# Patient Record
Sex: MALE | Race: BLACK OR AFRICAN AMERICAN | NOT HISPANIC OR LATINO | Employment: UNEMPLOYED | URBAN - METROPOLITAN AREA
[De-identification: names, ages, dates, MRNs, and addresses within clinical notes are randomized per-mention and may not be internally consistent; named-entity substitution may affect disease eponyms.]

---

## 2021-07-23 ENCOUNTER — HISTORICAL (OUTPATIENT)
Dept: ADMINISTRATIVE | Facility: HOSPITAL | Age: 21
End: 2021-07-23

## 2021-07-23 LAB — SARS-COV-2 RNA RESP QL NAA+PROBE: NOT DETECTED

## 2022-04-10 ENCOUNTER — HISTORICAL (OUTPATIENT)
Dept: ADMINISTRATIVE | Facility: HOSPITAL | Age: 22
End: 2022-04-10

## 2022-04-25 VITALS
OXYGEN SATURATION: 100 % | WEIGHT: 185.19 LBS | SYSTOLIC BLOOD PRESSURE: 142 MMHG | BODY MASS INDEX: 23.03 KG/M2 | HEIGHT: 75 IN | DIASTOLIC BLOOD PRESSURE: 80 MMHG

## 2023-01-16 ENCOUNTER — OFFICE VISIT (OUTPATIENT)
Dept: URGENT CARE | Facility: CLINIC | Age: 23
End: 2023-01-16
Payer: MEDICAID

## 2023-01-16 VITALS
BODY MASS INDEX: 22.75 KG/M2 | DIASTOLIC BLOOD PRESSURE: 83 MMHG | OXYGEN SATURATION: 100 % | SYSTOLIC BLOOD PRESSURE: 149 MMHG | HEART RATE: 54 BPM | RESPIRATION RATE: 16 BRPM | TEMPERATURE: 99 F | HEIGHT: 75 IN | WEIGHT: 183 LBS

## 2023-01-16 DIAGNOSIS — R21 RASH OF BODY: Primary | ICD-10-CM

## 2023-01-16 PROBLEM — Z91.89 AT RISK FOR INFECTION: Status: ACTIVE | Noted: 2023-01-16

## 2023-01-16 PROCEDURE — 99215 OFFICE O/P EST HI 40 MIN: CPT | Mod: PBBFAC | Performed by: NURSE PRACTITIONER

## 2023-01-16 PROCEDURE — 99213 OFFICE O/P EST LOW 20 MIN: CPT | Mod: S$PBB,,, | Performed by: NURSE PRACTITIONER

## 2023-01-16 PROCEDURE — 99213 PR OFFICE/OUTPT VISIT, EST, LEVL III, 20-29 MIN: ICD-10-PCS | Mod: S$PBB,,, | Performed by: NURSE PRACTITIONER

## 2023-01-16 RX ORDER — TRIAMCINOLONE ACETONIDE 1 MG/G
OINTMENT TOPICAL 2 TIMES DAILY
Qty: 30 G | Refills: 0 | Status: SHIPPED | OUTPATIENT
Start: 2023-01-16 | End: 2023-01-30

## 2023-01-16 RX ORDER — KETOCONAZOLE 20 MG/G
CREAM TOPICAL DAILY
Qty: 60 G | Refills: 0 | Status: SHIPPED | OUTPATIENT
Start: 2023-01-16

## 2023-01-16 NOTE — PROGRESS NOTES
"Subjective:       Patient ID: Epi Coreas is a 23 y.o. male.    Vitals:  height is 6' 2.8" (1.9 m) and weight is 83 kg (183 lb). His oral temperature is 99 °F (37.2 °C). His blood pressure is 149/83 (abnormal) and his pulse is 54 (abnormal). His respiration is 16 and oxygen saturation is 100%.     Chief Complaint: Rash (R upper leg x 1 year)    HPI x1 year. States it itches sometimes, but he is mostly bothered by how it looks (dark). 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 He does not appear ill. normal  Pulmonary/Chest: Effort normal.   Abdominal: Normal appearance.   Neurological: He is alert and oriented to person, place, and time.   Skin: Skin is rash. lesion        Psychiatric: His behavior is normal. Mood, judgment and thought content normal.   Vitals reviewed.      Assessment:       1. Rash of body            Plan:         Rash of body  -     Ambulatory referral/consult to Dermatology    Other orders  -     ketoconazole (NIZORAL) 2 % cream; Apply topically once daily. To silvery rash.  Dispense: 60 g; Refill: 0  -     triamcinolone acetonide 0.1% (KENALOG) 0.1 % ointment; Apply topically 2 (two) times daily. To darker/hyperpigmented parts of rash. for 14 days  Dispense: 30 g; Refill: 0         Use triamcinolone cream on the darker parts of the rash only.  Use ketoconazole on the silvery, lighter areas of rash.  You have been referred to Dermatology, but if you see no improvement or change at all after 2 weeks of using the medications every day, come back to  for a recheck.    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             "

## 2023-01-16 NOTE — PATIENT INSTRUCTIONS
Use triamcinolone cream on the darker parts of the rash only.  Use ketoconazole on the silvery, lighter areas of rash.  You have been referred to Dermatology, but if you see no improvement or change at all after 2 weeks of using the medications every day, come back to  for a recheck.    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